# Patient Record
Sex: MALE | URBAN - METROPOLITAN AREA
[De-identification: names, ages, dates, MRNs, and addresses within clinical notes are randomized per-mention and may not be internally consistent; named-entity substitution may affect disease eponyms.]

---

## 2023-03-29 ENCOUNTER — APPOINTMENT (RX ONLY)
Dept: URBAN - METROPOLITAN AREA CLINIC 35 | Facility: CLINIC | Age: 43
Setting detail: DERMATOLOGY
End: 2023-03-29

## 2023-03-29 DIAGNOSIS — L64.8 OTHER ANDROGENIC ALOPECIA: ICD-10-CM

## 2023-03-29 PROCEDURE — ? PRESCRIPTION

## 2023-03-29 PROCEDURE — ? PRESCRIPTION MEDICATION MANAGEMENT

## 2023-03-29 PROCEDURE — ? DIAGNOSIS COMMENT

## 2023-03-29 PROCEDURE — ? COUNSELING

## 2023-03-29 PROCEDURE — 99204 OFFICE O/P NEW MOD 45 MIN: CPT

## 2023-03-29 RX ORDER — MINOXIDIL 2.5 MG/1
TABLET ORAL
Qty: 90 | Refills: 4 | Status: ERX | COMMUNITY
Start: 2023-03-29

## 2023-03-29 RX ADMIN — MINOXIDIL: 2.5 TABLET ORAL at 00:00

## 2023-03-29 NOTE — PROCEDURE: MIPS QUALITY
Quality 130: Documentation Of Current Medications In The Medical Record: Current Medications Documented
Quality 110: Preventive Care And Screening: Influenza Immunization: Influenza Immunization not Administered for Documented Reasons.
Quality 431: Preventive Care And Screening: Unhealthy Alcohol Use - Screening: Patient not screened for unhealthy alcohol use using a systematic screening method
Quality 226: Preventive Care And Screening: Tobacco Use: Screening And Cessation Intervention: Patient screened for tobacco use and is an ex/non-smoker
Detail Level: Detailed

## 2023-03-29 NOTE — HPI: HAIR LOSS
Additional History: Pt reports receding hair line. Pt has been treating with minoxidil 5% and nioxin shampoo for about a year with no improvement.

## 2023-03-29 NOTE — PROCEDURE: DIAGNOSIS COMMENT
Comment: Denies hx of cardiovascular disease. Discussed oral treatment such as minoxidil ; side effects of leg swelling, lowering blood pressure, fluid build up around the heart, possible allergic reaction; and finastride ; possible erectile dysfunction, depression, possible allergic reaction, and suicidal tendencies. Pt opted for minoxidil 2.5 mg daily. \\nIf no improvement cosmetic procedures such as hair transplants discussed. Pt aware he would have to follow up with a plastic surgeon.
Detail Level: Simple
Render Risk Assessment In Note?: no

## 2023-08-04 ENCOUNTER — APPOINTMENT (RX ONLY)
Dept: URBAN - METROPOLITAN AREA CLINIC 35 | Facility: CLINIC | Age: 43
Setting detail: DERMATOLOGY
End: 2023-08-04

## 2023-08-04 DIAGNOSIS — L64.8 OTHER ANDROGENIC ALOPECIA: ICD-10-CM | Status: IMPROVED

## 2023-08-04 PROCEDURE — ? COUNSELING

## 2023-08-04 PROCEDURE — ? PRESCRIPTION MEDICATION MANAGEMENT

## 2023-08-04 PROCEDURE — ? PRESCRIPTION

## 2023-08-04 PROCEDURE — 99213 OFFICE O/P EST LOW 20 MIN: CPT

## 2023-08-04 PROCEDURE — ? DIAGNOSIS COMMENT

## 2023-08-04 RX ORDER — MINOXIDIL 2.5 MG/1
TABLET ORAL
Qty: 90 | Refills: 4 | Status: ERX

## 2023-08-04 NOTE — PROCEDURE: DIAGNOSIS COMMENT
Comment: Notes hair loss is doing well and has no side effects of medication. Discussed alopecia is chronic and anticipate long term use of this medication. Follow up in one year.
Detail Level: Simple
Render Risk Assessment In Note?: no

## 2024-07-18 ENCOUNTER — APPOINTMENT (RX ONLY)
Dept: URBAN - METROPOLITAN AREA CLINIC 35 | Facility: CLINIC | Age: 44
Setting detail: DERMATOLOGY
End: 2024-07-18

## 2024-07-18 DIAGNOSIS — L64.8 OTHER ANDROGENIC ALOPECIA: ICD-10-CM

## 2024-07-18 PROCEDURE — 99214 OFFICE O/P EST MOD 30 MIN: CPT

## 2024-07-18 PROCEDURE — ? PRESCRIPTION MEDICATION MANAGEMENT

## 2024-07-18 PROCEDURE — ? PRESCRIPTION

## 2024-07-18 PROCEDURE — ? DIAGNOSIS COMMENT

## 2024-07-18 PROCEDURE — ? COUNSELING

## 2024-07-18 RX ORDER — FINASTERIDE 1 MG/1
TABLET, FILM COATED ORAL
Qty: 90 | Refills: 1 | Status: ERX | COMMUNITY
Start: 2024-07-18

## 2024-07-18 RX ORDER — MINOXIDIL 2.5 MG/1
TABLET ORAL
Qty: 90 | Refills: 1 | Status: ERX

## 2024-07-18 RX ADMIN — FINASTERIDE: 1 TABLET, FILM COATED ORAL at 00:00

## 2024-07-18 NOTE — PROCEDURE: DIAGNOSIS COMMENT
Comment: Pt reports hair loss has been overall stable, but hasn't noticed much improvement. Pt still noticing shedding throughout the scalp. Pt has been taking oral minoxidil > 1 year. Denied significant side effects from medication. Denied fatigue or daytime drowsiness from medication. \\n\\nDiscussed etiology of male pattern hair loss thoroughly with pt.\\nDiscussed RBSE of oral minoxidil vs finasteride vs dutasteride thoroughly with pt.\\nDiscussed PRP vs hair transplants. \\nAdvised pt to monitor for active hair shedding. \\nDiscussed combination therapy of oral minoxidil and finasteride.\\n\\nPhotos taken today.\\nPt would like to proceed with oral finasteride and minoxidil.\\nAdvised pt to stop finasteride if adverse side effects/develop.\\nF/u in 6 months.
Detail Level: Simple
Render Risk Assessment In Note?: no

## 2024-07-18 NOTE — PROCEDURE: PRESCRIPTION MEDICATION MANAGEMENT
Plan: Begin combination of finasteride 1mg and minoxidil 2.5mg QD, f/u in 6 months.
Continue Regimen: Minoxidil 2.5mg QD
Initiate Treatment: Finasteride 1mg QD
Detail Level: Zone
Render In Strict Bullet Format?: No

## 2024-08-05 ENCOUNTER — APPOINTMENT (RX ONLY)
Dept: URBAN - METROPOLITAN AREA CLINIC 35 | Facility: CLINIC | Age: 44
Setting detail: DERMATOLOGY
End: 2024-08-05

## 2024-08-05 DIAGNOSIS — L64.8 OTHER ANDROGENIC ALOPECIA: ICD-10-CM

## 2024-08-05 PROCEDURE — ? PRESCRIPTION

## 2024-08-05 PROCEDURE — ? CONSENT FOR TELEMEDICINE VISIT OBTAINED

## 2024-08-05 PROCEDURE — ? ADDITIONAL NOTES

## 2024-08-05 PROCEDURE — 99214 OFFICE O/P EST MOD 30 MIN: CPT | Mod: 95

## 2024-08-05 PROCEDURE — ? DIAGNOSIS COMMENT

## 2024-08-05 PROCEDURE — ? PRESCRIPTION MEDICATION MANAGEMENT

## 2024-08-05 PROCEDURE — ? COUNSELING

## 2024-08-05 RX ORDER — MINOXIDIL 2.5 MG/1
TABLET ORAL
Qty: 180 | Refills: 3 | Status: ERX

## 2024-08-05 NOTE — PROCEDURE: DIAGNOSIS COMMENT
Comment: Pt reports hair loss has been overall stable to slightly improved, would like to discuss further treatment. Pt has been taking oral minoxidil, Denied significant side effects from medication. Denied fatigue or daytime drowsiness from medication. Discussed off-label use of oral minoxidil and rare but reported SE including pericardial and pleural effusions.
Detail Level: Simple
Render Risk Assessment In Note?: no

## 2024-08-05 NOTE — PROCEDURE: ADDITIONAL NOTES
Additional Notes: Discussed etiology of male pattern hair loss thoroughly with pt.\\nDiscussed RBSE of oral minoxidil vs finasteride vs topicals thoroughly with pt.\\nPt opts not to start finasteride and to increase to oral minoxidil 5mg daily
Render Risk Assessment In Note?: no
Detail Level: Simple

## 2024-08-05 NOTE — PROCEDURE: PRESCRIPTION MEDICATION MANAGEMENT
Continue Regimen: Minoxidil 2.5mg but increase to 5mg daily if tolerated; if SE occur, can decrease to 3.75mg or 2.5mg daily
Discontinue Regimen: Finasteride 1mg QD - did not take
Detail Level: Zone
Render In Strict Bullet Format?: No